# Patient Record
(demographics unavailable — no encounter records)

---

## 2025-04-09 NOTE — PHYSICAL EXAM
[General Appearance - Alert] : alert [General Appearance - In No Acute Distress] : in no acute distress [General Appearance - Well Nourished] : well nourished [General Appearance - Well Developed] : well developed [Oriented To Time, Place, And Person] : oriented to person, place, and time [Impaired Insight] : insight and judgment were intact [Affect] : the affect was normal [Mood] : the mood was normal [Motor Tone] : muscle tone was normal in all four extremities [Motor Strength] : muscle strength was normal in all four extremities [Sclera] : the sclera and conjunctiva were normal [Outer Ear] : the ears and nose were normal in appearance [Hearing Threshold Finger Rub Not [place]] : hearing was normal [Neck Appearance] : the appearance of the neck was normal [Neck Cervical Mass (___cm)] : no neck mass was observed

## 2025-04-09 NOTE — PHYSICAL EXAM
[General Appearance - Alert] : alert [General Appearance - In No Acute Distress] : in no acute distress [General Appearance - Well Nourished] : well nourished [General Appearance - Well Developed] : well developed [Oriented To Time, Place, And Person] : oriented to person, place, and time [Impaired Insight] : insight and judgment were intact [Affect] : the affect was normal [Mood] : the mood was normal [Motor Tone] : muscle tone was normal in all four extremities [Motor Strength] : muscle strength was normal in all four extremities [Sclera] : the sclera and conjunctiva were normal [Outer Ear] : the ears and nose were normal in appearance [Hearing Threshold Finger Rub Not [place]] : hearing was normal [Neck Appearance] : the appearance of the neck was normal [Neck Cervical Mass (___cm)] : no neck mass was observed
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
## 2025-04-09 NOTE — ASSESSMENT
[FreeTextEntry1] : 60 years old man 3 years s/p right kprdty-tyoqdf-ksddinbdb craniotomy for removal of a large left subfrontal clinoid meningioma. MRI of brain w/o evidence of residual or recurrent meningioma. He is doing well without any complaints offered   Plan: - MRI head with and without contrast in 18 months - Follow up after MRI

## 2025-04-09 NOTE — ASSESSMENT
[FreeTextEntry1] : 60 years old man 3 years s/p right eifsdx-dlmqns-nalopsmyr craniotomy for removal of a large left subfrontal clinoid meningioma. MRI of brain w/o evidence of residual or recurrent meningioma. He is doing well without any complaints offered   Plan: - MRI head with and without contrast in 18 months - Follow up after MRI